# Patient Record
Sex: FEMALE | Race: WHITE | Employment: UNEMPLOYED | URBAN - METROPOLITAN AREA
[De-identification: names, ages, dates, MRNs, and addresses within clinical notes are randomized per-mention and may not be internally consistent; named-entity substitution may affect disease eponyms.]

---

## 2018-07-02 ENCOUNTER — TRANSFERRED RECORDS (OUTPATIENT)
Dept: HEALTH INFORMATION MANAGEMENT | Facility: CLINIC | Age: 48
End: 2018-07-02

## 2018-07-26 ENCOUNTER — TRANSFERRED RECORDS (OUTPATIENT)
Dept: HEALTH INFORMATION MANAGEMENT | Facility: CLINIC | Age: 48
End: 2018-07-26

## 2018-07-30 NOTE — TELEPHONE ENCOUNTER
Date of appointment: 8/13/18   Diagnosis/reason for appointment: Pancreatic cyst  Referring provider/facility: [facility] /  Specialty  Who called: patient    Recent Studies  Imaging: US 7/26/18, Endo 7/16/18, CT 7/2/18  Pathology: None  Labs: 7/25/18  Previous chemo/radiation (if known):    Records requested/received from: [facility]    Additional information:

## 2018-08-04 NOTE — TELEPHONE ENCOUNTER
8/4/18 Imaging received from [facility] & is attached/viewable

## 2018-08-07 ENCOUNTER — CARE COORDINATION (OUTPATIENT)
Dept: SURGERY | Facility: CLINIC | Age: 48
End: 2018-08-07

## 2018-08-07 NOTE — PROGRESS NOTES
Care Coordination New Patient Referral  Surgical Oncology and GI    NP referral date:  7/30/18  Referred to MD: JENNIFER Naranjo    Diagnosis: pancreas cyst    Imaging:   CT yes    Date:  7/2/18  MRI  no    Procedures:  EUS 7/26/18 - see care everywhere report    Referral noted on Dr. Naranjo's schedule for next week.  Will need to obtain cytology and labs related to EUS prior to this from [facility]. I have sent message to scheduling to obtain.     Mayra Hall  BSN, HNBC  RN Care Coordinator  Surgical Oncology  Ph: [phone]  Email: [email or web address]

## 2018-08-13 ENCOUNTER — PRE VISIT (OUTPATIENT)
Dept: GASTROENTEROLOGY | Facility: CLINIC | Age: 48
End: 2018-08-13

## 2018-08-13 ENCOUNTER — OFFICE VISIT (OUTPATIENT)
Dept: SURGERY | Facility: CLINIC | Age: 48
End: 2018-08-13
Attending: SURGERY
Payer: COMMERCIAL

## 2018-08-13 VITALS
TEMPERATURE: 98.8 F | DIASTOLIC BLOOD PRESSURE: 74 MMHG | HEART RATE: 87 BPM | RESPIRATION RATE: 18 BRPM | OXYGEN SATURATION: 97 % | SYSTOLIC BLOOD PRESSURE: 111 MMHG | HEIGHT: 67 IN

## 2018-08-13 DIAGNOSIS — D49.0 IPMN (INTRADUCTAL PAPILLARY MUCINOUS NEOPLASM): Primary | ICD-10-CM

## 2018-08-13 PROCEDURE — G0463 HOSPITAL OUTPT CLINIC VISIT: HCPCS | Mod: ZF

## 2018-08-13 PROCEDURE — 99205 OFFICE O/P NEW HI 60 MIN: CPT | Mod: ZP | Performed by: SURGERY

## 2018-08-13 RX ORDER — ACETAMINOPHEN 500 MG
1000 TABLET ORAL DAILY
COMMUNITY
Start: 2016-05-12

## 2018-08-13 RX ORDER — CYCLOBENZAPRINE HCL 10 MG
10 TABLET ORAL
COMMUNITY
Start: 2017-11-30 | End: 2019-10-21

## 2018-08-13 RX ORDER — CETIRIZINE HYDROCHLORIDE 10 MG/1
10 TABLET ORAL
COMMUNITY
Start: 2016-05-12

## 2018-08-13 RX ORDER — DIPHENOXYLATE HYDROCHLORIDE AND ATROPINE SULFATE 2.5; .025 MG/1; MG/1
1 TABLET ORAL
COMMUNITY
Start: 2013-01-10

## 2018-08-13 RX ORDER — BACLOFEN 20 MG
TABLET ORAL
COMMUNITY

## 2018-08-13 RX ORDER — IBUPROFEN 200 MG
TABLET ORAL
COMMUNITY
Start: 2016-05-12

## 2018-08-13 RX ORDER — MULTIVITAMIN WITH IRON
TABLET ORAL
COMMUNITY
End: 2019-10-21

## 2018-08-13 RX ORDER — FLUTICASONE PROPIONATE 50 MCG
2 SPRAY, SUSPENSION (ML) NASAL
COMMUNITY
Start: 2016-06-15 | End: 2019-10-21

## 2018-08-13 RX ORDER — POLYETHYLENE GLYCOL 3350 17 G/17G
1 POWDER, FOR SOLUTION ORAL
COMMUNITY
Start: 2016-05-12

## 2018-08-13 ASSESSMENT — PAIN SCALES - GENERAL: PAINLEVEL: NO PAIN (0)

## 2018-08-13 NOTE — LETTER
"8/13/2018       RE: Nasrin Baxter  [address] [place] [address]     Dear Colleague,    Thank you for referring your patient, Nasrin Baxter, to the [facility]. Please see a copy of my visit note below.    [facility] - Surgical Oncology    NEW CONSULTATION  Aug 13, 2018    Nasrin Baxter is a 48 year old female who presents with an intraductal papillary mucinous neoplasm.  She was referred by Self.    HISTORY OF PRESENT ILLNESS:  She reports that she has had long-standing \"gastrointestinal issues\" that consist of crampy lower abdominal pain, constipation, etc.  She has not received a clear-cut diagnosis at this point.  She recently had some worsening of the symptoms of crampy lower abdominal/pelvic pain and underwent cross-sectional imaging.  The CT scan revealed some mild wall thickening of the rectum with trace pelvic fluid.  There is a 1.7 cm cystic lesion within the head of the pancreas.  She underwent a colonoscopy which did not reveal any obvious pathology.  She then had an EUS performed by Dr. Chelsey Stewart for further evaluation of the pancreatic cystic lesion.  The EUS was performed on July 26, 2018, which revealed 1 multiseptated cystic lesion in the pancreatic head appeared to communicate with a non-dilated main pancreatic duct.  This cyst fluid amylase was 134,027 and CEA was 35.8.  The cytology was consistent with a mucinous neoplasm.  She was told that she had an IPMN and was scheduled for follow-up in 1 year.  The patient self-referred to the [facility] for a second opinion about her IPMN.    The patient relates above-stated series of events.  She denies ever having had reviews bouts of pancreatitis.  The abdominal complaints that she has not been long-standing.  The pain is predominantly lower abdominal and pelvic and crampy in nature.  She has not had any significant weight loss.    PAST MEDICAL " HISTORY:  Gastrointestinal issues, constipation.  Cervical dysplasia.    PAST SURGICAL HISTORY:  No abdominal surgeries.  Several gynecologic procedures related to cervical dysplasia.    Current Outpatient Prescriptions   Medication Sig Dispense Refill     acetaminophen (TYLENOL) 500 MG tablet Take 1,000 mg by mouth daily        calcium-vitamin D 500-125 MG-UNIT TABS Take 1 tablet by mouth daily       cetirizine (ZYRTEC) 10 MG tablet Take 10 mg by mouth       cyclobenzaprine (FLEXERIL) 10 MG tablet Take 10 mg by mouth       Docosahexaenoic Acid (DHA OMEGA 3) 100 MG CAPS        fluticasone (FLONASE) 50 MCG/ACT spray 2 sprays       Garlic Oil 3 MG CAPS        ibuprofen (ADVIL/MOTRIN) 200 MG tablet Two tablets daily as needed       Multiple Vitamin (MULTI-VITAMINS) TABS Take 1 tablet by mouth       omeprazole (PRILOSEC) 20 MG CR capsule        polyethylene glycol (MIRALAX/GLYCOLAX) Packet Take 1 packet by mouth       PREBIOTIC PRODUCT PO        Probiotic Product (ACIDOPHILUS/GOAT MILK) CAPS        UNABLE TO FIND Psyllium Fiber powder. One tablespoon three times daily             Allergies   Allergen Reactions     Sulfa Drugs Rash     Latex      PN: Converted from LW Latex Sensitivity Flag     No Clinical Screening - See Comments      PN: LW Other1: -Latex sensitivity        SOCIAL HISTORY:   reports that she quit smoking about 11 years ago. Her smoking use included Cigarettes. She started smoking about 24 years ago. She has never used smokeless tobacco. She reports that she drinks about 1.2 oz of alcohol per week  She reports that she does not use illicit drugs.    FAMILY HISTORY:  No history of gastrointestinal malignancies    ROS  A full 14-point review of systems was performed, and the pertinent positives and negatives were mentioned in the history of present illness.    /74 (BP Location: Left arm, Patient Position: Sitting, Cuff Size: Adult Large)  Pulse 87  Temp 98.8  F (37.1  C) (Tympanic)  Resp 18  Ht  "1.7 m (5' 6.93\")  LMP 08/10/2018  SpO2 97%   Physical Exam  General: No acute distress, healthy-appearing.  Head: Normocephalic, anicteric sclera.  Neck: No cervical or supraclavicular adenopathy.  Pulmonary: Bilateral chest rise, clear to auscultation bilaterally.  Cardiac: Regular rate and rhythm.  Abdomen: No abdominal scars, soft, nontender, nondistended.  No masses organomegaly appreciated.  Extremities: No lower extremity edema.    INVESTIGATIONS:  Labs: No new lab work.  Cyst fluid studies as above.    CT (July 2, 2018): There is a cystic lesion within the pancreatic head at the superior aspect of the pancreas, that does appear to have multiple internal septations.  The pancreatic duct is normal in caliber.  There are no other lesions within the pancreas.    FNA (July 26, 2018): Consistent with a mucinous neoplasm.    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    The natural history of IPMN were discussed with the patient and her .  I reviewed her outside imaging with her and use a diagram to depict the location of this lesion and its relationship to the surrounding anatomic structures.  We spent a significant amount of time discussing \"worrisome features\" of IPMN.  She does not have any worrisome features this is a sidebranch IPMN it is 1.7 cm in diameter.  I agree with Dr. Chelsey Tang's recommendation for continued surveillance.  I explained to the patient that I would only consider surgical resection if she developed some worrisome features or had significant growth of this IPMN.  I briefly described the Whipple procedure and all of its potential complications, essentially to illustrate that the risks of the procedure definitely outweigh any potential benefit at this time.  I told the patient I would be happy to see her back in clinic with an MRI in 6 months.  The patient, her , and her family will likely be moving to [facility] " [place] in the near future, and she is anxious about access to quality health care providers with knowledge and experience in dealing with IPMN.  I told her that the future would be willing to try and help her identify a suitable provider.  She had no remaining questions at the completion of our consultation.    Total time spent with the patient was 60 minutes, of which more than half was counseling.     Sandoval Naranjo MD    Division of Surgical Oncology  [facility]

## 2018-08-13 NOTE — MR AVS SNAPSHOT
After Visit Summary   8/13/2018    Nasrin Baxter    MRN: [id number]           Patient Information     Date Of Birth          1970        Visit Information        Provider Department      8/13/2018 1:30 PM Sandoval Naranjo MD [facility]        Today's Diagnoses     IPMN (intraductal papillary mucinous neoplasm)    -  1       Follow-ups after your visit        Your next 10 appointments already scheduled     Feb 04, 2019  2:30 PM CST   MR ABDOMEN W CONTRAST with UCMR1   [facility] ([facility])    [address] [address] [phone]           Take your medicines as usual, unless your doctor tells you not to. Bring a list of your current medicines to your exam (including vitamins, minerals and over-the-counter drugs). Also bring the results of similar scans you may have had.    You may or may not receive IV contrast for this exam pending the discretion of the Radiologist.   Do not eat or drink for 6 hours prior to exam.  The MRI machine uses a strong magnet. Please wear clothes without metal (snaps, zippers). A sweatsuit works well, or we may give you a hospital gown.  Please remove any body piercings and hair extensions before you arrive. You will also remove watches, jewelry, hairpins, wallets, dentures, partial dental plates and hearing aids. You may wear contact lenses, and you may be able to wear your rings. We have a safe place to keep your personal items, but it is safer to leave them at home.  **IMPORTANT** THE INSTRUCTIONS BELOW ARE ONLY FOR THOSE PATIENTS WHO HAVE BEEN PRESCRIBED SEDATION OR GENERAL ANESTHESIA DURING THEIR MRI PROCEDURE:  IF YOUR DOCTOR PRESCRIBED ORAL SEDATION (take medicine to help you relax during your exam):   You must get the medicine from your doctor (oral medication) before you arrive. Bring the medicine to the exam. Do not take it at home. You ll be told when to take it  upon arriving for your exam.   Arrive one hour early. Bring someone who can take you home after the test. Your medicine will make you sleepy. After the exam, you may not drive, take a bus or take a taxi by yourself.  IF YOUR DOCTOR PRESCRIBED IV SEDATION:   Arrive one hour early. Bring someone who can take you home after the test. Your medicine will make you sleepy. After the exam, you may not drive, take a bus or take a taxi by yourself.   No eating 6 hours before your exam. You may have clear liquids up until 4 hours before your exam. (Clear liquids include water, clear tea, black coffee and fruit juice without pulp.)  IF YOUR DOCTOR PRESCRIBED ANESTHESIA (be asleep for your exam):   Arrive 1 1/2 hours early. Bring someone who can take you home after the test. You may not drive, take a bus or take a taxi by yourself.   No eating 8 hours before your exam. You may have clear liquids up until 4 hours before your exam. (Clear liquids include water, clear tea, black coffee and fruit juice without pulp.)   You will spend four to five hours in the recovery room.  If you have any questions, please contact your Imaging Department exam site.            Feb 11, 2019  1:00 PM CST   (Arrive by 12:45 PM)   Return Visit with Sandoval Naranjo MD   [facility] ([facility])    [address] [facility]  [address] [place] [address] [phone]              Who to contact     If you have questions or need follow up information about today's clinic visit or your schedule please contact [facility] directly at [phone].  Normal or non-critical lab and imaging results will be communicated to you by MyChart, letter or phone within 4 business days after the clinic has received the results. If you do not hear from us within 7 days, please contact the clinic through MyChart or phone. If you have a critical or abnormal lab result, we will notify you by phone as soon as  "possible.  Submit refill requests through [employer] or call your pharmacy and they will forward the refill request to us. Please allow 3 business days for your refill to be completed.          Additional Information About Your Visit        [employer] Interactive Information     [employer] lets you send messages to your doctor, view your test results, renew your prescriptions, schedule appointments and more. To sign up, go to [email or web address] . Click on \"Log in\" on the left side of the screen, which will take you to the Welcome page. Then click on \"Sign up Now\" on the right side of the page.     You will be asked to enter the access code listed below, as well as some personal information. Please follow the directions to create your username and password.     Your access code is: [email or web address]  Expires: 10/29/2018  6:30 AM     Your access code will  in 90 days. If you need help or a new code, please call your [place] clinic or [phone].        Care EveryWhere ID     This is your Care EveryWhere ID. This could be used by other organizations to access your [place] medical records  [id number]        Your Vitals Were     Pulse Temperature Respirations Height Last Period Pulse Oximetry    87 98.8  F (37.1  C) (Tympanic) 18 1.7 m (5' 6.93\") 08/10/2018 97%       Blood Pressure from Last 3 Encounters:   18 111/74    Weight from Last 3 Encounters:   No data found for Wt               Primary Care Provider Fax #    [facility] [phone]       [address] [place] [address]        Equal Access to Services     [facility]: Hadii aad ku hadasho Soomaali, waaxda luqadaha, qaybta kaalmada adeegyafer, joe sarmiento . So [facility] [phone].    ATENCIÓN: Si habla español, tiene a celaya disposición servicios gratuitos de asistencia lingüística. Llame al [phone].    We comply with applicable federal civil rights laws and [place] laws. We do not discriminate on the basis " of race, color, national origin, age, disability, sex, sexual orientation, or gender identity.            Thank you!     Thank you for choosing [facility]  for your care. Our goal is always to provide you with excellent care. Hearing back from our patients is one way we can continue to improve our services. Please take a few minutes to complete the written survey that you may receive in the mail after your visit with us. Thank you!             Your Updated Medication List - Protect others around you: Learn how to safely use, store and throw away your medicines at www.disposemymeds.org.          This list is accurate as of 8/13/18 11:59 PM.  Always use your most recent med list.                   Brand Name Dispense Instructions for use Diagnosis    acetaminophen 500 MG tablet    TYLENOL     Take 1,000 mg by mouth daily        Acidophilus/Goat Milk Caps           calcium-vitamin D 500-125 MG-UNIT Tabs      Take 1 tablet by mouth daily        cetirizine 10 MG tablet    zyrTEC     Take 10 mg by mouth        cyclobenzaprine 10 MG tablet    FLEXERIL     Take 10 mg by mouth        DHA Omega 3 100 MG Caps           fluticasone 50 MCG/ACT spray    FLONASE     2 sprays        Garlic Oil 3 MG Caps           ibuprofen 200 MG tablet    ADVIL/MOTRIN     Two tablets daily as needed        MULTI-VITAMINS Tabs      Take 1 tablet by mouth        omeprazole 20 MG CR capsule    priLOSEC          polyethylene glycol Packet    MIRALAX/GLYCOLAX     Take 1 packet by mouth        PREBIOTIC PRODUCT PO           UNABLE TO FIND      Psyllium Fiber powder. One tablespoon three times daily

## 2018-08-13 NOTE — NURSING NOTE
"Oncology Rooming Note    August 13, 2018 1:35 PM   Nasrin Baxter is a 48 year old female who presents for:    Chief Complaint   Patient presents with     Oncology Clinic Visit     New patient visit related to Pancreatic Cyst     Initial Vitals: /74 (BP Location: Left arm, Patient Position: Sitting, Cuff Size: Adult Large)  Pulse 87  Temp 98.8  F (37.1  C) (Tympanic)  Resp 18  Ht 1.7 m (5' 6.93\")  LMP 08/10/2018  SpO2 97% There is no height or weight on file to calculate BMI. There is no height or weight on file to calculate BSA.  No Pain (0) Comment: Data Unavailable   Patient's last menstrual period was 08/10/2018.  Allergies reviewed: Yes  Medications reviewed: Yes    Medications: Medication refills not needed today.  Pharmacy name entered into EPIC: Data Unavailable    Clinical concerns: No new concerns. Provider was notified.    10 minutes for nursing intake (face to face time)     Callie Rodriguez LPN            "

## 2018-08-13 NOTE — PROGRESS NOTES
"[facility] Phys[facility] - Surgical Oncology    NEW CONSULTATION  Aug 13, 2018    Nasrin Baxter is a 48 year old female who presents with an intraductal papillary mucinous neoplasm.  She was referred by Self.    HISTORY OF PRESENT ILLNESS:  She reports that she has had long-standing \"gastrointestinal issues\" that consist of crampy lower abdominal pain, constipation, etc.  She has not received a clear-cut diagnosis at this point.  She recently had some worsening of the symptoms of crampy lower abdominal/pelvic pain and underwent cross-sectional imaging.  The CT scan revealed some mild wall thickening of the rectum with trace pelvic fluid.  There is a 1.7 cm cystic lesion within the head of the pancreas.  She underwent a colonoscopy which did not reveal any obvious pathology.  She then had an EUS performed by Dr. Chelsey Stewart for further evaluation of the pancreatic cystic lesion.  The EUS was performed on July 26, 2018, which revealed 1 multiseptated cystic lesion in the pancreatic head appeared to communicate with a non-dilated main pancreatic duct.  This cyst fluid amylase was 134,027 and CEA was 35.8.  The cytology was consistent with a mucinous neoplasm.  She was told that she had an IPMN and was scheduled for follow-up in 1 year.  The patient self-referred to the [facility] for a second opinion about her IPMN.    The patient relates above-stated series of events.  She denies ever having had reviews bouts of pancreatitis.  The abdominal complaints that she has not been long-standing.  The pain is predominantly lower abdominal and pelvic and crampy in nature.  She has not had any significant weight loss.    PAST MEDICAL HISTORY:  Gastrointestinal issues, constipation.  Cervical dysplasia.    PAST SURGICAL HISTORY:  No abdominal surgeries.  Several gynecologic procedures related to cervical dysplasia.    Current Outpatient Prescriptions   Medication Sig Dispense Refill     " "acetaminophen (TYLENOL) 500 MG tablet Take 1,000 mg by mouth daily        calcium-vitamin D 500-125 MG-UNIT TABS Take 1 tablet by mouth daily       cetirizine (ZYRTEC) 10 MG tablet Take 10 mg by mouth       cyclobenzaprine (FLEXERIL) 10 MG tablet Take 10 mg by mouth       Docosahexaenoic Acid (DHA OMEGA 3) 100 MG CAPS        fluticasone (FLONASE) 50 MCG/ACT spray 2 sprays       Garlic Oil 3 MG CAPS        ibuprofen (ADVIL/MOTRIN) 200 MG tablet Two tablets daily as needed       Multiple Vitamin (MULTI-VITAMINS) TABS Take 1 tablet by mouth       omeprazole (PRILOSEC) 20 MG CR capsule        polyethylene glycol (MIRALAX/GLYCOLAX) Packet Take 1 packet by mouth       PREBIOTIC PRODUCT PO        Probiotic Product (ACIDOPHILUS/GOAT MILK) CAPS        UNABLE TO FIND Psyllium Fiber powder. One tablespoon three times daily             Allergies   Allergen Reactions     Sulfa Drugs Rash     Latex      PN: Converted from LW Latex Sensitivity Flag     No Clinical Screening - See Comments      PN: LW Other1: -Latex sensitivity        SOCIAL HISTORY:   reports that she quit smoking about 11 years ago. Her smoking use included Cigarettes. She started smoking about 24 years ago. She has never used smokeless tobacco. She reports that she drinks about 1.2 oz of alcohol per week  She reports that she does not use illicit drugs.    FAMILY HISTORY:  No history of gastrointestinal malignancies    ROS  A full 14-point review of systems was performed, and the pertinent positives and negatives were mentioned in the history of present illness.    /74 (BP Location: Left arm, Patient Position: Sitting, Cuff Size: Adult Large)  Pulse 87  Temp 98.8  F (37.1  C) (Tympanic)  Resp 18  Ht 1.7 m (5' 6.93\")  LMP 08/10/2018  SpO2 97%   Physical Exam  General: No acute distress, healthy-appearing.  Head: Normocephalic, anicteric sclera.  Neck: No cervical or supraclavicular adenopathy.  Pulmonary: Bilateral chest rise, clear to auscultation " "bilaterally.  Cardiac: Regular rate and rhythm.  Abdomen: No abdominal scars, soft, nontender, nondistended.  No masses organomegaly appreciated.  Extremities: No lower extremity edema.    INVESTIGATIONS:  Labs: No new lab work.  Cyst fluid studies as above.    CT (July 2, 2018): There is a cystic lesion within the pancreatic head at the superior aspect of the pancreas, that does appear to have multiple internal septations.  The pancreatic duct is normal in caliber.  There are no other lesions within the pancreas.    FNA (July 26, 2018): Consistent with a mucinous neoplasm.    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    The natural history of IPMN were discussed with the patient and her .  I reviewed her outside imaging with her and use a diagram to depict the location of this lesion and its relationship to the surrounding anatomic structures.  We spent a significant amount of time discussing \"worrisome features\" of IPMN.  She does not have any worrisome features this is a sidebranch IPMN it is 1.7 cm in diameter.  I agree with Dr. Chelsey Tang's recommendation for continued surveillance.  I explained to the patient that I would only consider surgical resection if she developed some worrisome features or had significant growth of this IPMN.  I briefly described the Whipple procedure and all of its potential complications, essentially to illustrate that the risks of the procedure definitely outweigh any potential benefit at this time.  I told the patient I would be happy to see her back in clinic with an MRI in 6 months.  The patient, her , and her family will likely be [place] to [place] in the near future, and she is anxious about access to quality health care providers with knowledge and experience in dealing with IPMN.  I told her that the future would be willing to try and help her identify a suitable provider.  She had no " remaining questions at the completion of our consultation.    Total time spent with the patient was 60 minutes, of which more than half was counseling.     Sandoval Naranjo MD    Division of Surgical Oncology  [facility]

## 2019-02-10 ENCOUNTER — ANCILLARY PROCEDURE (OUTPATIENT)
Dept: MRI IMAGING | Facility: CLINIC | Age: 49
End: 2019-02-10
Attending: SURGERY
Payer: COMMERCIAL

## 2019-02-10 DIAGNOSIS — D49.0 IPMN (INTRADUCTAL PAPILLARY MUCINOUS NEOPLASM): ICD-10-CM

## 2019-02-10 RX ORDER — GADOBUTROL 604.72 MG/ML
10 INJECTION INTRAVENOUS ONCE
Status: COMPLETED | OUTPATIENT
Start: 2019-02-10 | End: 2019-02-10

## 2019-02-10 RX ADMIN — GADOBUTROL 10 ML: 604.72 INJECTION INTRAVENOUS at 15:30

## 2019-02-10 NOTE — DISCHARGE INSTRUCTIONS
MRI Contrast Discharge Instructions    The IV contrast you received today will pass out of your body in your  urine. This will happen in the next 24 hours. You will not feel this process.  Your urine will not change color.    Drink at least 4 extra glasses of water or juice today (unless your doctor  has restricted your fluids). This reduces the stress on your kidneys.  You may take your regular medicines.    If you are on dialysis: It is best to have dialysis today.    If you have a reaction: Most reactions happen right away. If you have  any new symptoms after leaving the hospital (such as hives or swelling),  call your hospital at the correct number below. Or call your family doctor.  If you have breathing distress or wheezing, call 911.    Special instructions: ***    I have read and understand the above information.    Signature:______________________________________ Date:___________    Staff:__________________________________________ Date:___________     Time:__________    [place] [facility] Departments:    ___[place]: [phone]  ___[facility]: [phone]  ___[place]: [phone] ___[facility]: [phone]  ___[facility]: [phone]  ___[facility]: [phone]  ___[phone]  ___[facility]: [phone]  ___[phone]

## 2019-02-11 ENCOUNTER — OFFICE VISIT (OUTPATIENT)
Dept: SURGERY | Facility: CLINIC | Age: 49
End: 2019-02-11
Attending: SURGERY
Payer: COMMERCIAL

## 2019-02-11 VITALS
OXYGEN SATURATION: 100 % | RESPIRATION RATE: 14 BRPM | DIASTOLIC BLOOD PRESSURE: 85 MMHG | HEART RATE: 83 BPM | TEMPERATURE: 97.8 F | BODY MASS INDEX: 31.39 KG/M2 | HEIGHT: 67 IN | SYSTOLIC BLOOD PRESSURE: 123 MMHG | WEIGHT: 200 LBS

## 2019-02-11 DIAGNOSIS — D49.0 INTRADUCTAL PAPILLARY MUCINOUS NEOPLASM OF PANCREAS: Primary | ICD-10-CM

## 2019-02-11 LAB — RADIOLOGIST FLAGS: NORMAL

## 2019-02-11 PROCEDURE — G0463 HOSPITAL OUTPT CLINIC VISIT: HCPCS | Mod: ZF

## 2019-02-11 PROCEDURE — 99214 OFFICE O/P EST MOD 30 MIN: CPT | Mod: ZP | Performed by: SURGERY

## 2019-02-11 RX ORDER — DIPHENHYDRAMINE HCL 25 MG
25 CAPSULE ORAL EVERY 6 HOURS PRN
COMMUNITY
End: 2019-10-21

## 2019-02-11 RX ORDER — NORTRIPTYLINE HCL 10 MG
10 CAPSULE ORAL 2 TIMES DAILY
COMMUNITY
Start: 2019-02-06 | End: 2020-02-06

## 2019-02-11 ASSESSMENT — MIFFLIN-ST. JEOR: SCORE: 1564.82

## 2019-02-11 ASSESSMENT — PAIN SCALES - GENERAL: PAINLEVEL: NO PAIN (0)

## 2019-02-11 NOTE — PROGRESS NOTES
"[facility] - Surgical Oncology    FOLLOW UP NOTE  Feb 11, 2019    Nasrin Baxter is a 49 year old female with sidebranch IPMN of the pancreatic head.      INTERVAL HISTORY:  I first saw Ms. Baxter in August 2018, after she had been incidentally diagnosed with a sidebranch IPMN.  She had an EUS performed by Chelsey Tang that demonstrated an elevated fluid amylase and a CEA level of approximately 40.  She has had no previous history of pancreatitis.  The lesion was actually more of a cluster of grapes measure approximately 2 cm in greatest dimension.  I recommended follow-up with MRI in 6 months,  Which is what she returns for today.    She denies any new abdominal symptoms.  She still has her usual intermittent crampy pain and/or constipation.  She does report that she has developed a headache over the last month, which is been present on almost a daily basis.  She is set up to see a neurologist later this week for evaluation.    Past Medical History and Past Surgical History remain unchanged from her initial consultation, except as outlined above.    /85   Pulse 83   Temp 97.8  F (36.6  C) (Oral)   Resp 14   Ht 1.702 m (5' 7\")   Wt 90.7 kg (200 lb)   LMP 02/09/2019 (Exact Date)   SpO2 100%   Breastfeeding? No   BMI 31.32 kg/m     Physical Exam  General: No acute distress  Head: Anicteric sclera  Neck: No cervical or supraclavicular adenopathy  Pulmonary: Bilateral chest rise  Cardiac: Regular rate  Abdomen: Small umbilical hernia, soft, nontender, nondistended.  Extremities: No lower extremity edema    INVESTIGATIONS:  Labs: No new labs    MRI (February 10, 2019): The sidebranch IPMN is redemonstrated and appears like a cluster of grapes.  When measured together the greatest diameter is 2.0 cm.  There is no pancreatic ductal dilation, no mural nodule, no worrisome features.    ASSESSMENT/PLAN:  Nasrin Baxter is a 49 year old female with a pancreatic head, sidebranch IPMN, 2 cm " in greatest dimension.    I reviewed the MRI with her and discussed the appearance of her previous imaging.  I explained her that there is been no significant change since her last imaging and EUS.  Due to the size of the lesion, I would recommend another follow-up MRI in 6 months.  If there is no change on the MRI, then I would plan on extending her surveillance visits to once a year.  We also once again discussed features that would be potentially worrisome, and I reiterated that she has none of these features.  The chance of her having malignancy in this is quite low. She had no remaining questions at the completion of our visit.  I explained to her that I will set up her follow-up visit with 1 of my colleagues.    Total time spent with the patient was 30 minutes, of which more than half was counseling.     Sandoval Naranjo MD    Division of Surgical Oncology  [facility]

## 2019-02-11 NOTE — NURSING NOTE
"Oncology Rooming Note    February 11, 2019 2:39 PM   Nasrin Baxter is a 49 year old female who presents for:    Chief Complaint   Patient presents with     Oncology Clinic Visit     Return Pancreatic Cyst     Initial Vitals: /85   Pulse 83   Temp 97.8  F (36.6  C) (Oral)   Resp 14   Ht 1.702 m (5' 7\")   Wt 90.7 kg (200 lb)   LMP 02/09/2019 (Exact Date)   SpO2 100%   Breastfeeding? No   BMI 31.32 kg/m   Estimated body mass index is 31.32 kg/m  as calculated from the following:    Height as of this encounter: 1.702 m (5' 7\").    Weight as of this encounter: 90.7 kg (200 lb). Body surface area is 2.07 meters squared.  No Pain (0) Comment: Data Unavailable   Patient's last menstrual period was 02/09/2019 (exact date).  Allergies reviewed: Yes  Medications reviewed: Yes    Medications: Medication refills not needed today.  Pharmacy name entered into [facility]: [facility] [place] [address] [place] [address]    Clinical concerns: MRI results; 6 month check     6 minutes for nursing intake (face to face time)     Zena Garcia CMA              "

## 2019-02-11 NOTE — LETTER
"2/11/2019       RE: Nasrin Baxter  [address]  [place] [address]     Dear Colleague,    Thank you for referring your patient, Nasrin Baxter, to the [facility]. Please see a copy of my visit note below.    [facility] - Surgical Oncology    FOLLOW UP NOTE  Feb 11, 2019    Nasrin Baxter is a 49 year old female with sidebranch IPMN of the pancreatic head.      INTERVAL HISTORY:  I first saw Ms. Baxter in August 2018, after she had been incidentally diagnosed with a sidebranch IPMN.  She had an EUS performed by Chelsey Tang that demonstrated an elevated fluid amylase and a CEA level of approximately 40.  She has had no previous history of pancreatitis.  The lesion was actually more of a cluster of grapes measure approximately 2 cm in greatest dimension.  I recommended follow-up with MRI in 6 months,  Which is what she returns for today.    She denies any new abdominal symptoms.  She still has her usual intermittent crampy pain and/or constipation.  She does report that she has developed a headache over the last month, which is been present on almost a daily basis.  She is set up to see a neurologist later this week for evaluation.    Past Medical History and Past Surgical History remain unchanged from her initial consultation, except as outlined above.    /85   Pulse 83   Temp 97.8  F (36.6  C) (Oral)   Resp 14   Ht 1.702 m (5' 7\")   Wt 90.7 kg (200 lb)   LMP 02/09/2019 (Exact Date)   SpO2 100%   Breastfeeding? No   BMI 31.32 kg/m      Physical Exam  General: No acute distress  Head: Anicteric sclera  Neck: No cervical or supraclavicular adenopathy  Pulmonary: Bilateral chest rise  Cardiac: Regular rate  Abdomen: Small umbilical hernia, soft, nontender, nondistended.  Extremities: No lower extremity edema    INVESTIGATIONS:  Labs: No new labs    MRI (February 10, 2019): The sidebranch IPMN is redemonstrated and appears like a cluster of grapes.  " When measured together the greatest diameter is 2.0 cm.  There is no pancreatic ductal dilation, no mural nodule, no worrisome features.    ASSESSMENT/PLAN:  Nasrin Baxter is a 49 year old female with a pancreatic head, sidebranch IPMN, 2 cm in greatest dimension.    I reviewed the MRI with her and discussed the appearance of her previous imaging.  I explained her that there is been no significant change since her last imaging and EUS.  Due to the size of the lesion, I would recommend another follow-up MRI in 6 months.  If there is no change on the MRI, then I would plan on extending her surveillance visits to once a year.  We also once again discussed features that would be potentially worrisome, and I reiterated that she has none of these features.  The chance of her having malignancy in this is quite low. She had no remaining questions at the completion of our visit.  I explained to her that I will set up her follow-up visit with 1 of my colleagues.    Total time spent with the patient was 30 minutes, of which more than half was counseling.       Sandoval Naranjo MD

## 2019-02-15 ENCOUNTER — HEALTH MAINTENANCE LETTER (OUTPATIENT)
Age: 49
End: 2019-02-15

## 2019-08-13 ENCOUNTER — CARE COORDINATION (OUTPATIENT)
Dept: GASTROENTEROLOGY | Facility: CLINIC | Age: 49
End: 2019-08-13

## 2019-08-13 DIAGNOSIS — K86.2 PANCREAS CYST: Primary | ICD-10-CM

## 2019-10-10 ENCOUNTER — TELEPHONE (OUTPATIENT)
Dept: GASTROENTEROLOGY | Facility: CLINIC | Age: 49
End: 2019-10-10

## 2019-10-10 NOTE — TELEPHONE ENCOUNTER
Dr Cardozo is attending on October 21, 2019.  Called Sharyn and asked that she call back to reschedule

## 2019-10-20 ENCOUNTER — ANCILLARY PROCEDURE (OUTPATIENT)
Dept: MRI IMAGING | Facility: CLINIC | Age: 49
End: 2019-10-20
Attending: INTERNAL MEDICINE
Payer: COMMERCIAL

## 2019-10-20 DIAGNOSIS — K86.2 PANCREAS CYST: ICD-10-CM

## 2019-10-20 LAB — RADIOLOGIST FLAGS: NORMAL

## 2019-10-20 RX ORDER — GADOBUTROL 604.72 MG/ML
10 INJECTION INTRAVENOUS ONCE
Status: COMPLETED | OUTPATIENT
Start: 2019-10-20 | End: 2019-10-20

## 2019-10-20 RX ADMIN — GADOBUTROL 9 ML: 604.72 INJECTION INTRAVENOUS at 11:34

## 2019-10-21 ENCOUNTER — OFFICE VISIT (OUTPATIENT)
Dept: GASTROENTEROLOGY | Facility: CLINIC | Age: 49
End: 2019-10-21
Attending: INTERNAL MEDICINE
Payer: COMMERCIAL

## 2019-10-21 VITALS
TEMPERATURE: 98.5 F | RESPIRATION RATE: 16 BRPM | BODY MASS INDEX: 30.04 KG/M2 | DIASTOLIC BLOOD PRESSURE: 85 MMHG | HEIGHT: 67 IN | WEIGHT: 191.4 LBS | OXYGEN SATURATION: 99 % | SYSTOLIC BLOOD PRESSURE: 120 MMHG | HEART RATE: 90 BPM

## 2019-10-21 DIAGNOSIS — Z23 NEED FOR PROPHYLACTIC VACCINATION AND INOCULATION AGAINST INFLUENZA: Primary | ICD-10-CM

## 2019-10-21 DIAGNOSIS — K86.2 PANCREAS CYST: ICD-10-CM

## 2019-10-21 PROCEDURE — 25000128 H RX IP 250 OP 636: Mod: ZF | Performed by: INTERNAL MEDICINE

## 2019-10-21 PROCEDURE — 96372 THER/PROPH/DIAG INJ SC/IM: CPT | Mod: ZF

## 2019-10-21 PROCEDURE — 90686 IIV4 VACC NO PRSV 0.5 ML IM: CPT | Mod: ZF | Performed by: INTERNAL MEDICINE

## 2019-10-21 PROCEDURE — G0463 HOSPITAL OUTPT CLINIC VISIT: HCPCS | Mod: ZF

## 2019-10-21 PROCEDURE — G0008 ADMIN INFLUENZA VIRUS VAC: HCPCS

## 2019-10-21 RX ADMIN — INFLUENZA A VIRUS A/BRISBANE/02/2018 IVR-190 (H1N1) ANTIGEN (FORMALDEHYDE INACTIVATED), INFLUENZA A VIRUS A/KANSAS/14/2017 X-327 (H3N2) ANTIGEN (FORMALDEHYDE INACTIVATED), INFLUENZA B VIRUS B/PHUKET/3073/2013 ANTIGEN (FORMALDEHYDE INACTIVATED), AND INFLUENZA B VIRUS B/MARYLAND/15/2016 BX-69A ANTIGEN (FORMALDEHYDE INACTIVATED) 0.5 ML: 15; 15; 15; 15 INJECTION, SUSPENSION INTRAMUSCULAR at 08:12

## 2019-10-21 ASSESSMENT — PAIN SCALES - GENERAL: PAINLEVEL: NO PAIN (0)

## 2019-10-21 ASSESSMENT — MIFFLIN-ST. JEOR: SCORE: 1525.81

## 2019-10-21 NOTE — LETTER
10/21/2019       RE: Nasrin Baxter  [address]  [place] [address]     Dear Colleague,    Thank you for referring your patient, Nasrin Baxter, to the [facility] at [facility]. Please see a copy of my visit note below.    OUTPATIENT GASTROENTEROLOGY CLINIC CONSULT      PRESENTING COMPLAINT:  The patient is a 49-year-old white female whom I was asked to see in consultation at the request of Dr. Sandoval Naranjo for transfer of care related to cystic lesions in the pancreas.      HISTORY OF PRESENT ILLNESS:  The patient was [facility] for lower abdominal cramping pain.  This led to a CT scan of the abdomen on 07/21/2018.  This showed a possible nonspecific mild wall thickening of the rectum, but also incidentally identified a 1.6 x 1.7 cm cystic lesion in the head of the pancreas.  Subsequently, she was referred for a colonoscopy, which was performed on 07/16.  This showed a normal colon and rectum.  She subsequently underwent endoscopic ultrasound by Dr. Chelsey Stewart for evaluation of her cystic lesions.  This was performed on 07/26.  This showed a multiseptated lesion in the head of the pancreas measuring up to 21 x 14 mm in diameter.  There were no solid associated masses, and the remainder of the pancreas was felt to be normal without additional focal lesions.  There was no dilation of the main pancreatic duct.  Needle aspiration was performed, and this returned approximately 1 mL of grossly mucinous fluid.  Fluid analysis showed an elevated amylase of 134,000 and a CEA which was borderline at 35.8.  Cytology, however, identified mucinous epithelium without high-grade features.  The patient was subsequently referred to see Dr. Sandoval Naranjo from our Surgical Oncology Department for consideration of surgical intervention.  Dr. Naranjo saw the patient on 08/13.  He recommended surveillance imaging.  The patient  has subsequently undergone an MRI of the abdomen with contrast on 02/10 and 10/20 of this year.  Dr. Naranjo has subsequently left our institution and referred the patient to our Gastroenterology Clinic for further surveillance.      Overall, the patient is doing well.  She denies any known past history of acute or chronic pancreatitis.  She denies upper abdominal pain.  She is not having diarrhea or steatorrhea.  There is no history of jaundice.  Her weight has been stable.      REVIEW OF SYSTEMS:  Otherwise negative and documented in self-reported inventory.      PAST MEDICAL HISTORY:  Significant for constipation, intraductal papillary mucinous neoplasm as discussed above and cervical dysplasia.      PAST SURGICAL HISTORY:  Significant for minor surgical procedures in the cervix, which sound consistent with cervical conization.  There is no other abdominal surgery.      ALLERGIES:  Sulfa and latex.      MEDICATIONS:  Reviewed and documented in Epic.      SOCIAL HISTORY:  She had an approximately 13 year history of tobacco use, but has not smoked for 13-14 years.  She drinks alcohol rarely and in moderation.  She recently moved to [facility], but would like to continue her medical care through our institution with regard to the pancreas.  She has several daughters who live in the [facility].      FAMILY HISTORY:  Negative for colon or pancreatic adenocarcinoma.      PHYSICAL EXAMINATION:   VITAL SIGNS:  Documented in Epic.   GENERAL:  The patient is awake, alert, in no acute distress.   HEENT:  No evidence of scleral icterus.   LUNGS:  Clear to auscultation.   CARDIAC:  Regular rate and rhythm without murmur, gallop or rub.   ABDOMEN:  Soft.  Bowel sounds are normoactive.  She has minimal tenderness to epigastric palpation without rebound, guarding or rigidity.  There is no palpable mass or hepatosplenomegaly.  There is no rebound, guarding or rigidity.   SKIN:  No jaundice.   PSYCHIATRIC:  Her affect is bright  and appropriate.      RADIOLOGIC DATA:  MRI of the abdomen from 10/20 was personally reviewed and reviewed with the patient today.  This shows a stable, 2.7 cm, complex multicystic lesion in the pancreatic head, which is suggestive of branch dilated pancreatic ducts consistent with intraductal papillary mucinous neoplasm.  No other pancreatic cysts are identified.  The pancreas otherwise does not have features of chronic pancreatitis.  The main pancreatic duct is nondilated.  Otherwise, the patient has incidental findings of simple-appearing cysts in the kidneys and liver, but no other significant abnormalities.      ASSESSMENT AND PLAN:  Branch duct variant intraductal papillary mucinous neoplasm.  The patient has already undergone an endoscopic ultrasound without the finding of high-risk or worrisome features, and surveillance MRIs have been stable.  She is asymptomatic.  We discussed the potential for malignant transformation; however, the fact is that this is statistically unlikely to occur.  We discussed a variety of published guidelines for surveillance.  I tend to follow the published consensus guidelines.  These guidelines recommend ongoing surveillance with MRI.  At this point given the recent stability, I believe we can change to an annual surveillance interval for the next 2 years.  If the cysts remain stable, then we can potentially move to every 3 year surveillance.  We discussed that surveillance is generally continued indefinitely.  I tend to stop at age 75, which is when we stop routine surveillance for colon cancer.  We would also stop if her overall health status changed in a way that would preclude intervention with her pancreas.      I instructed her to contact me for more urgent evaluation if she experiences weight loss, which is unexplained of greater than 15 pounds, develops chronic diarrhea, develops jaundice or is diagnosed with acute pancreatitis.        We will contact her in 1 year to  coordinate followup MRI and clinic visit at that time.      Cedric Cardozo MD

## 2019-10-21 NOTE — NURSING NOTE
"Oncology Rooming Note    October 21, 2019 8:06 AM   Nasrin Baxter is a 49 year old female who presents for:    Chief Complaint   Patient presents with     Oncology Clinic Visit     RETURN VISIT; PANCREAS CYST; VITALS TAKEN      Initial Vitals: /85   Pulse 90   Temp 98.5  F (36.9  C) (Oral)   Resp 16   Ht 1.702 m (5' 7\")   Wt 86.8 kg (191 lb 6.4 oz)   SpO2 99%   BMI 29.98 kg/m   Estimated body mass index is 29.98 kg/m  as calculated from the following:    Height as of this encounter: 1.702 m (5' 7\").    Weight as of this encounter: 86.8 kg (191 lb 6.4 oz). Body surface area is 2.03 meters squared.  No Pain (0) Comment: Data Unavailable   No LMP recorded.  Allergies reviewed: Yes  Medications reviewed: Yes    Medications: Medication refills not needed today.  Pharmacy name entered into EPIC:    [facility] [place] [place] [address] [facility] - [place], [place] - [address]    Clinical concerns: No new concerns today  Dr Cardozo was notified.      Lorri Greene              "

## 2019-10-21 NOTE — PROGRESS NOTES
OUTPATIENT GASTROENTEROLOGY CLINIC CONSULT      PRESENTING COMPLAINT:  The patient is a 49-year-old white female whom I was asked to see in consultation at the request of Dr. Sandoval Naranjo for transfer of care related to cystic lesions in the pancreas.      HISTORY OF PRESENT ILLNESS:  The patient was initially evaluated through the [facility] system for lower abdominal cramping pain.  This led to a CT scan of the abdomen on 07/21/2018.  This showed a possible nonspecific mild wall thickening of the rectum, but also incidentally identified a 1.6 x 1.7 cm cystic lesion in the head of the pancreas.  Subsequently, she was referred for a colonoscopy, which was performed on 07/16.  This showed a normal colon and rectum.  She subsequently underwent endoscopic ultrasound by Dr. Chelsey Stewart for evaluation of her cystic lesions.  This was performed on 07/26.  This showed a multiseptated lesion in the head of the pancreas measuring up to 21 x 14 mm in diameter.  There were no solid associated masses, and the remainder of the pancreas was felt to be normal without additional focal lesions.  There was no dilation of the main pancreatic duct.  Needle aspiration was performed, and this returned approximately 1 mL of grossly mucinous fluid.  Fluid analysis showed an elevated amylase of 134,000 and a CEA which was borderline at 35.8.  Cytology, however, identified mucinous epithelium without high-grade features.  The patient was subsequently referred to see Dr. Sandoval Naranjo from our Surgical Oncology Department for consideration of surgical intervention.  Dr. Naranjo saw the patient on 08/13.  He recommended surveillance imaging.  The patient has subsequently undergone an MRI of the abdomen with contrast on 02/10 and 10/20 of this year.  Dr. Naranjo has subsequently left our institution and referred the patient to our Gastroenterology Clinic for further surveillance.      Overall, the patient is doing well.  She denies any known past  history of acute or chronic pancreatitis.  She denies upper abdominal pain.  She is not having diarrhea or steatorrhea.  There is no history of jaundice.  Her weight has been stable.      REVIEW OF SYSTEMS:  Otherwise negative and documented in self-reported inventory.      PAST MEDICAL HISTORY:  Significant for constipation, intraductal papillary mucinous neoplasm as discussed above and cervical dysplasia.      PAST SURGICAL HISTORY:  Significant for minor surgical procedures in the cervix, which sound consistent with cervical conization.  There is no other abdominal surgery.      ALLERGIES:  Sulfa and latex.      MEDICATIONS:  Reviewed and documented in Epic.      SOCIAL HISTORY:  She had an approximately 13 year history of tobacco use, but has not smoked for 13-14 years.  She drinks alcohol rarely and in moderation.  [place], but would like to continue her medical care through our institution with regard to the pancreas.  She has several daughters who live in the [facility].      FAMILY HISTORY:  Negative for colon or pancreatic adenocarcinoma.      PHYSICAL EXAMINATION:   VITAL SIGNS:  Documented in Epic.   GENERAL:  The patient is awake, alert, in no acute distress.   HEENT:  No evidence of scleral icterus.   LUNGS:  Clear to auscultation.   CARDIAC:  Regular rate and rhythm without murmur, gallop or rub.   ABDOMEN:  Soft.  Bowel sounds are normoactive.  She has minimal tenderness to epigastric palpation without rebound, guarding or rigidity.  There is no palpable mass or hepatosplenomegaly.  There is no rebound, guarding or rigidity.   SKIN:  No jaundice.   PSYCHIATRIC:  Her affect is bright and appropriate.      RADIOLOGIC DATA:  MRI of the abdomen from 10/20 was personally reviewed and reviewed with the patient today.  This shows a stable, 2.7 cm, complex multicystic lesion in the pancreatic head, which is suggestive of branch dilated pancreatic ducts consistent with intraductal  papillary mucinous neoplasm.  No other pancreatic cysts are identified.  The pancreas otherwise does not have features of chronic pancreatitis.  The main pancreatic duct is nondilated.  Otherwise, the patient has incidental findings of simple-appearing cysts in the kidneys and liver, but no other significant abnormalities.      ASSESSMENT AND PLAN:  Branch duct variant intraductal papillary mucinous neoplasm.  The patient has already undergone an endoscopic ultrasound without the finding of high-risk or worrisome features, and surveillance MRIs have been stable.  She is asymptomatic.  We discussed the potential for malignant transformation; however, the fact is that this is statistically unlikely to occur.  We discussed a variety of published guidelines for surveillance.  I tend to follow the published consensus guidelines.  These guidelines recommend ongoing surveillance with MRI.  At this point given the recent stability, I believe we can change to an annual surveillance interval for the next 2 years.  If the cysts remain stable, then we can potentially move to every 3 year surveillance.  We discussed that surveillance is generally continued indefinitely.  I tend to stop at age 75, which is when we stop routine surveillance for colon cancer.  We would also stop if her overall health status changed in a way that would preclude intervention with her pancreas.      I instructed her to contact me for more urgent evaluation if she experiences weight loss, which is unexplained of greater than 15 pounds, develops chronic diarrhea, develops jaundice or is diagnosed with acute pancreatitis.        We will contact her in 1 year to coordinate followup MRI and clinic visit at that time.      Cedric Cardozo MD

## 2019-10-21 NOTE — NURSING NOTE
Influenza vaccine given to patient in Left Deltoid. Patient tolerated injection without any incidents.     Has the patient received the information for the injectable influenza vaccine? YES      Lorri Greene CMA ([facility]) October 21, 2019 8:13 AM

## 2020-02-24 ENCOUNTER — HEALTH MAINTENANCE LETTER (OUTPATIENT)
Age: 50
End: 2020-02-24

## 2020-12-13 ENCOUNTER — HEALTH MAINTENANCE LETTER (OUTPATIENT)
Age: 50
End: 2020-12-13

## 2021-04-17 ENCOUNTER — HEALTH MAINTENANCE LETTER (OUTPATIENT)
Age: 51
End: 2021-04-17

## 2021-09-26 ENCOUNTER — HEALTH MAINTENANCE LETTER (OUTPATIENT)
Age: 51
End: 2021-09-26

## 2022-05-08 ENCOUNTER — HEALTH MAINTENANCE LETTER (OUTPATIENT)
Age: 52
End: 2022-05-08

## 2023-01-14 ENCOUNTER — HEALTH MAINTENANCE LETTER (OUTPATIENT)
Age: 53
End: 2023-01-14

## 2023-06-02 ENCOUNTER — HEALTH MAINTENANCE LETTER (OUTPATIENT)
Age: 53
End: 2023-06-02